# Patient Record
Sex: FEMALE | Race: WHITE | Employment: STUDENT | URBAN - NONMETROPOLITAN AREA
[De-identification: names, ages, dates, MRNs, and addresses within clinical notes are randomized per-mention and may not be internally consistent; named-entity substitution may affect disease eponyms.]

---
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
## 2022-12-08 ENCOUNTER — HOSPITAL ENCOUNTER (EMERGENCY)
Age: 22
Discharge: HOME | End: 2022-12-08
Attending: EMERGENCY MEDICINE
Payer: COMMERCIAL

## 2022-12-08 VITALS
HEIGHT: 65 IN | WEIGHT: 255 LBS | OXYGEN SATURATION: 96 % | BODY MASS INDEX: 42.49 KG/M2 | DIASTOLIC BLOOD PRESSURE: 95 MMHG | SYSTOLIC BLOOD PRESSURE: 145 MMHG | HEART RATE: 104 BPM | RESPIRATION RATE: 16 BRPM | TEMPERATURE: 99.1 F

## 2022-12-08 DIAGNOSIS — T78.40XA ALLERGIC REACTION, INITIAL ENCOUNTER: ICD-10-CM

## 2022-12-08 DIAGNOSIS — Z91.018 ALLERGY TO CASHEW NUT: Primary | ICD-10-CM

## 2022-12-08 PROCEDURE — 2500000003 HC RX 250 WO HCPCS: Performed by: EMERGENCY MEDICINE

## 2022-12-08 PROCEDURE — 2580000003 HC RX 258: Performed by: EMERGENCY MEDICINE

## 2022-12-08 PROCEDURE — 96374 THER/PROPH/DIAG INJ IV PUSH: CPT

## 2022-12-08 PROCEDURE — 6370000000 HC RX 637 (ALT 250 FOR IP): Performed by: EMERGENCY MEDICINE

## 2022-12-08 PROCEDURE — A4216 STERILE WATER/SALINE, 10 ML: HCPCS | Performed by: EMERGENCY MEDICINE

## 2022-12-08 PROCEDURE — 96372 THER/PROPH/DIAG INJ SC/IM: CPT

## 2022-12-08 PROCEDURE — 6360000002 HC RX W HCPCS: Performed by: EMERGENCY MEDICINE

## 2022-12-08 PROCEDURE — 99284 EMERGENCY DEPT VISIT MOD MDM: CPT

## 2022-12-08 PROCEDURE — 94640 AIRWAY INHALATION TREATMENT: CPT

## 2022-12-08 PROCEDURE — 94664 DEMO&/EVAL PT USE INHALER: CPT

## 2022-12-08 PROCEDURE — 96376 TX/PRO/DX INJ SAME DRUG ADON: CPT

## 2022-12-08 PROCEDURE — 96375 TX/PRO/DX INJ NEW DRUG ADDON: CPT

## 2022-12-08 RX ORDER — PREDNISONE 20 MG/1
40 TABLET ORAL DAILY
Qty: 10 TABLET | Refills: 0 | Status: SHIPPED | OUTPATIENT
Start: 2022-12-08 | End: 2022-12-13

## 2022-12-08 RX ORDER — DEXAMETHASONE SODIUM PHOSPHATE 10 MG/ML
10 INJECTION, SOLUTION INTRAMUSCULAR; INTRAVENOUS ONCE
Status: COMPLETED | OUTPATIENT
Start: 2022-12-08 | End: 2022-12-08

## 2022-12-08 RX ORDER — METHYLPREDNISOLONE SODIUM SUCCINATE 125 MG/2ML
125 INJECTION, POWDER, LYOPHILIZED, FOR SOLUTION INTRAMUSCULAR; INTRAVENOUS ONCE
Status: COMPLETED | OUTPATIENT
Start: 2022-12-08 | End: 2022-12-08

## 2022-12-08 RX ORDER — SODIUM CHLORIDE 9 MG/ML
INJECTION, SOLUTION INTRAVENOUS CONTINUOUS
Status: DISCONTINUED | OUTPATIENT
Start: 2022-12-08 | End: 2022-12-08 | Stop reason: HOSPADM

## 2022-12-08 RX ORDER — IPRATROPIUM BROMIDE AND ALBUTEROL SULFATE 2.5; .5 MG/3ML; MG/3ML
1 SOLUTION RESPIRATORY (INHALATION) ONCE
Status: COMPLETED | OUTPATIENT
Start: 2022-12-08 | End: 2022-12-08

## 2022-12-08 RX ORDER — EPINEPHRINE 0.3 MG/.3ML
0.3 INJECTION SUBCUTANEOUS ONCE
Qty: 0.3 ML | Refills: 0 | Status: SHIPPED | OUTPATIENT
Start: 2022-12-08 | End: 2022-12-08

## 2022-12-08 RX ORDER — IPRATROPIUM BROMIDE AND ALBUTEROL SULFATE 2.5; .5 MG/3ML; MG/3ML
1 SOLUTION RESPIRATORY (INHALATION)
Status: DISCONTINUED | OUTPATIENT
Start: 2022-12-08 | End: 2022-12-08

## 2022-12-08 RX ORDER — DIPHENHYDRAMINE HYDROCHLORIDE 50 MG/ML
25 INJECTION INTRAMUSCULAR; INTRAVENOUS ONCE
Status: COMPLETED | OUTPATIENT
Start: 2022-12-08 | End: 2022-12-08

## 2022-12-08 RX ADMIN — DEXAMETHASONE SODIUM PHOSPHATE 10 MG: 10 INJECTION, SOLUTION INTRAMUSCULAR; INTRAVENOUS at 18:37

## 2022-12-08 RX ADMIN — FAMOTIDINE 20 MG: 10 INJECTION INTRAVENOUS at 18:37

## 2022-12-08 RX ADMIN — EPINEPHRINE 0.5 MG: 1 INJECTION INTRAMUSCULAR; INTRAVENOUS; SUBCUTANEOUS at 18:36

## 2022-12-08 RX ADMIN — SODIUM CHLORIDE: 9 INJECTION, SOLUTION INTRAVENOUS at 18:48

## 2022-12-08 RX ADMIN — DIPHENHYDRAMINE HYDROCHLORIDE 25 MG: 50 INJECTION, SOLUTION INTRAMUSCULAR; INTRAVENOUS at 18:37

## 2022-12-08 RX ADMIN — EPINEPHRINE 0.5 MG: 1 INJECTION INTRAMUSCULAR; INTRAVENOUS; SUBCUTANEOUS at 17:16

## 2022-12-08 RX ADMIN — METHYLPREDNISOLONE SODIUM SUCCINATE 125 MG: 125 INJECTION, POWDER, FOR SOLUTION INTRAMUSCULAR; INTRAVENOUS at 17:20

## 2022-12-08 RX ADMIN — DIPHENHYDRAMINE HYDROCHLORIDE 25 MG: 50 INJECTION, SOLUTION INTRAMUSCULAR; INTRAVENOUS at 17:19

## 2022-12-08 RX ADMIN — IPRATROPIUM BROMIDE AND ALBUTEROL SULFATE 1 AMPULE: 2.5; .5 SOLUTION RESPIRATORY (INHALATION) at 18:23

## 2022-12-08 ASSESSMENT — PAIN - FUNCTIONAL ASSESSMENT
PAIN_FUNCTIONAL_ASSESSMENT: NONE - DENIES PAIN
PAIN_FUNCTIONAL_ASSESSMENT: NONE - DENIES PAIN

## 2022-12-08 NOTE — Clinical Note
Sachin Verma was seen and treated in our emergency department on 12/8/2022. She may return to work on 12/09/2022. If you have any questions or concerns, please don't hesitate to call.       Chani Lorenzo, DO

## 2022-12-08 NOTE — ED PROVIDER NOTES
28-year-old female with complaint of hives and swelling and some wheezing that began a few minutes after eating cashews. Patient smokes cigarettes but is in otherwise good health. No previous history of severe allergic reaction. Physical exam  Generalized patient is awake and alert  Skin is quite flushed and she has some hives on her back and there is swelling of the lips and tongue. Uvula is normal.  Pulmonary-some wheezing but overall good breath sounds. Heart regular rate and rhythm    Immediate plan was epinephrine 0.5 mg IM while an IV was started and patient received Benadryl and Solu-Medrol. Patient was rechecked approximately 30 minutes later and face is still red and lips seem more swollen and wheezing is more pronounced. Patient received bronchodilators by nebulizer and another dose of epinephrine IM and a dose of famotidine. At 1930 I rechecked this patient and her color is much improved and the lip swelling has dramatically improved as well as the tongue and she states she feels much better. Plan is to observe this patient till least 2030 and then make a decision about discharge but I explained to her it will be very important that she keep EpiPen's with her. 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 Patient was given scripts for short course of prednisone and EpiPen's. Recommended she follow-up with her primary care physician and  allergist for further testing. She is to return if swelling recurs.      Chani Lorenoz,   12/08/22 2023

## 2025-04-28 LAB
NON-UH HIE BACTERIA:PRTHR:PT:URINE:ORD:AUTOMATED: ABNORMAL /HPF
NON-UH HIE BILIRUBIN:PRTHR:PT:URINE:ORD:TEST STRIP.AUTOMATED: NEGATIVE MG/DL
NON-UH HIE CLARITY:TYPE:PT:URINE:NOM:: ABNORMAL
NON-UH HIE CLASS:TYPE:PT:URINE COLLECTION METHOD:NOM:*: ABNORMAL
NON-UH HIE COLOR:TYPE:PT:URINE:NOM:AUTO: YELLOW
NON-UH HIE EPITHELIAL CELLS.SQUAMOUS:NARIC:PT:URINE SED:QN:AUTOMATED COUNT: >10 CD:4673145863
NON-UH HIE GLUCOSE:PRTHR:PT:URINE:ORD:TEST STRIP: NEGATIVE MG/DL
NON-UH HIE HEMOGLOBIN:MCNC:PT:URINE:SEMIQN:TEST STRIP.AUTOMATED: NEGATIVE MG/DL
NON-UH HIE KETONES:PRTHR:PT:URINE:ORD:TEST STRIP.AUTOMATED: NEGATIVE MG/DL
NON-UH HIE LEUKOCYTE ESTERASE:PRTHR:PT:URINE:ORD:TEST STRIP.AUTOMATED: ABNORMAL CD:4673125267
NON-UH HIE LEUKOCYTES:NARIC:PT:URINE SED:QN:AUTOMATED COUNT: ABNORMAL CD:4673145863 (ref 0–5)
NON-UH HIE MUCUS:PRTHR:PT:URINE:ORD:AUTOMATED: ABNORMAL CD:4673145661
NON-UH HIE NITRITE:PRTHR:PT:URINE:ORD:TEST STRIP.AUTOMATED: NEGATIVE MG/DL
NON-UH HIE PH:LSCNC:PT:URINE:QN:TEST STRIP: 6 (ref 5–9)
NON-UH HIE PROTEIN:PRTHR:PT:URINE:ORD:TEST STRIP: NEGATIVE MG/DL
NON-UH HIE SPECIFIC GRAVITY:RDEN:PT:URINE:QN:TEST STRIP: 1.02 (ref 1–1.03)
NON-UH HIE UROBILINOGEN:MCNC:PT:URINE:SEMIQN:TEST STRIP: NEGATIVE MG/DL